# Patient Record
Sex: FEMALE | Race: WHITE | Employment: OTHER | URBAN - METROPOLITAN AREA
[De-identification: names, ages, dates, MRNs, and addresses within clinical notes are randomized per-mention and may not be internally consistent; named-entity substitution may affect disease eponyms.]

---

## 2017-12-11 ENCOUNTER — DOCUMENTATION ONLY (OUTPATIENT)
Dept: FAMILY MEDICINE CLINIC | Age: 77
End: 2017-12-11

## 2017-12-11 ENCOUNTER — HOSPITAL ENCOUNTER (OUTPATIENT)
Dept: LAB | Age: 77
Discharge: HOME | End: 2017-12-11
Payer: MEDICARE

## 2017-12-11 ENCOUNTER — OFFICE VISIT (OUTPATIENT)
Dept: FAMILY MEDICINE CLINIC | Age: 77
End: 2017-12-11

## 2017-12-11 VITALS
HEART RATE: 94 BPM | DIASTOLIC BLOOD PRESSURE: 98 MMHG | BODY MASS INDEX: 24.46 KG/M2 | HEIGHT: 65 IN | SYSTOLIC BLOOD PRESSURE: 152 MMHG | RESPIRATION RATE: 18 BRPM | WEIGHT: 146.8 LBS | TEMPERATURE: 97.7 F | OXYGEN SATURATION: 93 %

## 2017-12-11 DIAGNOSIS — E03.9 ACQUIRED HYPOTHYROIDISM: ICD-10-CM

## 2017-12-11 DIAGNOSIS — E78.5 HYPERLIPIDEMIA, UNSPECIFIED HYPERLIPIDEMIA TYPE: ICD-10-CM

## 2017-12-11 DIAGNOSIS — R09.81 SINUS CONGESTION: Primary | ICD-10-CM

## 2017-12-11 DIAGNOSIS — Z76.89 ENCOUNTER TO ESTABLISH CARE: ICD-10-CM

## 2017-12-11 LAB
CHOLEST SERPL-MCNC: 186 MG/DL
HDLC SERPL-MCNC: 59 MG/DL (ref 40–60)
HDLC SERPL: 3.2 {RATIO} (ref 0–5)
LDLC SERPL CALC-MCNC: 92.8 MG/DL (ref 0–100)
LIPID PROFILE,FLP: ABNORMAL
TRIGL SERPL-MCNC: 171 MG/DL (ref ?–150)
TSH SERPL DL<=0.05 MIU/L-ACNC: 1.01 UIU/ML (ref 0.36–3.74)
VLDLC SERPL CALC-MCNC: 34.2 MG/DL

## 2017-12-11 PROCEDURE — 84443 ASSAY THYROID STIM HORMONE: CPT | Performed by: PHYSICIAN ASSISTANT

## 2017-12-11 PROCEDURE — 80061 LIPID PANEL: CPT | Performed by: PHYSICIAN ASSISTANT

## 2017-12-11 PROCEDURE — 36415 COLL VENOUS BLD VENIPUNCTURE: CPT | Performed by: PHYSICIAN ASSISTANT

## 2017-12-11 RX ORDER — LEVOTHYROXINE SODIUM 112 UG/1
TABLET ORAL
COMMUNITY
End: 2017-12-11 | Stop reason: SDUPTHER

## 2017-12-11 RX ORDER — ATORVASTATIN CALCIUM 20 MG/1
20 TABLET, FILM COATED ORAL DAILY
Qty: 30 TAB | Refills: 2 | Status: SHIPPED | OUTPATIENT
Start: 2017-12-11 | End: 2019-01-05 | Stop reason: SDUPTHER

## 2017-12-11 RX ORDER — ESZOPICLONE 3 MG/1
3 TABLET, FILM COATED ORAL
Refills: 0 | COMMUNITY
Start: 2017-11-16 | End: 2017-12-11 | Stop reason: SDUPTHER

## 2017-12-11 RX ORDER — LEVOTHYROXINE SODIUM 112 UG/1
112 TABLET ORAL
Qty: 30 TAB | Refills: 2 | Status: SHIPPED | OUTPATIENT
Start: 2017-12-11 | End: 2019-01-05 | Stop reason: SDUPTHER

## 2017-12-11 RX ORDER — CYCLOBENZAPRINE HCL 10 MG
10 TABLET ORAL
COMMUNITY
End: 2018-06-19 | Stop reason: SDUPTHER

## 2017-12-11 RX ORDER — AMOXICILLIN AND CLAVULANATE POTASSIUM 500; 125 MG/1; MG/1
1 TABLET, FILM COATED ORAL 2 TIMES DAILY
Qty: 20 TAB | Refills: 0 | Status: SHIPPED | OUTPATIENT
Start: 2017-12-11 | End: 2017-12-21

## 2017-12-11 RX ORDER — ESZOPICLONE 3 MG/1
3 TABLET, FILM COATED ORAL
Qty: 30 TAB | Refills: 0 | Status: SHIPPED | OUTPATIENT
Start: 2017-12-11 | End: 2018-01-08 | Stop reason: SDUPTHER

## 2017-12-11 RX ORDER — OMEPRAZOLE 20 MG/1
20 CAPSULE, DELAYED RELEASE ORAL DAILY
COMMUNITY
End: 2018-06-19 | Stop reason: SDUPTHER

## 2017-12-11 RX ORDER — ATORVASTATIN CALCIUM 20 MG/1
TABLET, FILM COATED ORAL DAILY
COMMUNITY
End: 2017-12-11 | Stop reason: SDUPTHER

## 2017-12-11 RX ORDER — METOPROLOL SUCCINATE 50 MG/1
50 TABLET, EXTENDED RELEASE ORAL DAILY
COMMUNITY
End: 2018-06-19 | Stop reason: SDUPTHER

## 2017-12-11 RX ORDER — ALBUTEROL SULFATE 90 UG/1
2 AEROSOL, METERED RESPIRATORY (INHALATION)
COMMUNITY
End: 2018-06-19 | Stop reason: SDUPTHER

## 2017-12-11 NOTE — MR AVS SNAPSHOT
[address] [facility] [address] 
[phone] Patient: Helen Jacome MRN: [id number] IF Visit Information Date & Time Provider Department Dept. Phone Encounter #  
 2017 10:00 AM Meseret Grande, [address] [facility] [phone] [id number] Upcoming Health Maintenance Date Due DTaP/Tdap/Td series (1 - Tdap) 1962 ZOSTER VACCINE AGE 60> 10/31/2001 GLAUCOMA SCREENING Q2Y 2006 OSTEOPOROSIS SCREENING (DEXA) 2006 Pneumococcal 65+ Low/Medium Risk (1 of 2 - PCV13) 2006 MEDICARE YEARLY EXAM 2006 Influenza Age 5 to Adult 2017 Allergies as of 2017  Review Complete On: 2017 By: SHAR Weaver Not on File Current Immunizations  Reviewed on 2017 Name Date Influenza High Dose Vaccine PF 2017 Reviewed by Andrzej Patricio LPN on  at 10:28 AM  
You Were Diagnosed With   
  
 Codes Comments Sinus congestion    -  Primary ICD-10-CM: R09.81 ICD-9-CM: 478.19 Acquired hypothyroidism     ICD-10-CM: E03.9 ICD-9-CM: 244.9 Hyperlipidemia, unspecified hyperlipidemia type     ICD-10-CM: E78.5 ICD-9-CM: 272.4 Encounter to establish care     ICD-10-CM: Z76.89 
ICD-9-CM: V65.8 Vitals BP Pulse Temp Resp Height(growth percentile) Weight(growth percentile) (!) 152/98 (BP 1 Location: Right arm, BP Patient Position: Sitting) 94 97.7 °F (36.5 °C) (Oral) 18 5' 4.5\" (1.638 m) 146 lb 12.8 oz (66.6 kg) SpO2 BMI OB Status Smoking Status 93% 24.81 kg/m2 Hysterectomy Never Smoker BMI and BSA Data Body Mass Index Body Surface Area  
 24.81 kg/m 2 1.74 m 2 Preferred Pharmacy Pharmacy Name Phone [address] [place] [address] [phone] Your Updated Medication List  
  
   
 This list is accurate as of: 12/11/17 12:14 PM.  Always use your most recent med list.  
  
  
  
  
 amoxicillin-clavulanate 500-125 mg per tablet Commonly known as:  AUGMENTIN Take 1 Tab by mouth two (2) times a day for 10 days. atorvastatin 20 mg tablet Commonly known as:  LIPITOR Take 1 Tab by mouth daily for 30 days. cyclobenzaprine 10 mg tablet Commonly known as:  FLEXERIL Take  by mouth three (3) times daily as needed for Muscle Spasm(s).  
  
 eszopiclone 3 mg tablet Commonly known as:  Denyce Luke Take 1 Tab by mouth nightly for 30 days. Max Daily Amount: 3 mg.  
  
 levothyroxine 112 mcg tablet Commonly known as:  SYNTHROID Take 1 Tab by mouth Daily (before breakfast) for 30 days. metoprolol succinate 50 mg XL tablet Commonly known as:  TOPROL-XL Take  by mouth daily. omeprazole 20 mg capsule Commonly known as:  PRILOSEC Take 20 mg by mouth daily. PROAIR HFA 90 mcg/actuation inhaler Generic drug:  albuterol Take 2 Puffs by inhalation three (3) times daily as needed for Wheezing. Prescriptions Printed Refills  
 atorvastatin (LIPITOR) 20 mg tablet 2 Sig: Take 1 Tab by mouth daily for 30 days. Class: Print Route: Oral  
 levothyroxine (SYNTHROID) 112 mcg tablet 2 Sig: Take 1 Tab by mouth Daily (before breakfast) for 30 days. Class: Print Route: Oral  
 eszopiclone (LUNESTA) 3 mg tablet 0 Sig: Take 1 Tab by mouth nightly for 30 days. Max Daily Amount: 3 mg. Class: Print Route: Oral  
 amoxicillin-clavulanate (AUGMENTIN) 500-125 mg per tablet 0 Sig: Take 1 Tab by mouth two (2) times a day for 10 days. Class: Print Route: Oral  
  
Introducing [facility]! [facility] introduces Contactually patient portal. Now you can access parts of your medical record, email your doctor's office, and request medication refills online. 1. In your internet browser, go to [email or web address] 2. Click on the First Time User? Click Here link in the Sign In box. You will see the New Member Sign Up page. 3. Enter your [employer] Access Code exactly as it appears below. You will not need to use this code after youve completed the sign-up process. If you do not sign up before the expiration date, you must request a new code. · [employer] Access Code: [email or web address] Expires: 3/11/2018 11:00 AM 
 
4. Enter the last four digits of your Social Security Number (xxxx) and Date of Birth (mm/dd/yyyy) as indicated and click Submit. You will be taken to the next sign-up page. 5. Create a [employer] ID. This will be your [employer] login ID and cannot be changed, so think of one that is secure and easy to remember. 6. Create a [employer] password. You can change your password at any time. 7. Enter your Password Reset Question and Answer. This can be used at a later time if you forget your password. 8. Enter your e-mail address. You will receive e-mail notification when new information is available in [address] 9. Click Sign Up. You can now view and download portions of your medical record. 10. Click the Download Summary menu link to download a portable copy of your medical information. If you have questions, please visit the Frequently Asked Questions section of the [employer] website. Remember, [employer] is NOT to be used for urgent needs. For medical emergencies, dial 911. Now available from your iPhone and Android! Please provide this summary of care documentation to your next provider. Your primary care clinician is listed as Azael Vance. If you have any questions after today's visit, please call [phone].

## 2017-12-11 NOTE — PROGRESS NOTES
Tyrell Sorenson is a 77 y.o. female presents in office to establish care. Has c/o upper respiratory infection.

## 2017-12-12 PROBLEM — E03.9 ACQUIRED HYPOTHYROIDISM: Status: ACTIVE | Noted: 2017-12-12

## 2017-12-12 PROBLEM — G47.9 SLEEP DISORDER: Status: ACTIVE | Noted: 2017-12-12

## 2017-12-12 PROBLEM — E78.5 HYPERLIPIDEMIA: Status: ACTIVE | Noted: 2017-12-12

## 2017-12-12 PROBLEM — I10 ESSENTIAL HYPERTENSION: Status: ACTIVE | Noted: 2017-12-12

## 2017-12-12 NOTE — PROGRESS NOTES
HISTORY OF PRESENT ILLNESS  Rohith Colorado is a 77 y.o. female who presents to center with sinus congestion and request for medication refill for her hypothyroidism and hyperlipidemia. She has had sinus congestion for approximately 1 week on a daily basis, congestion was mild initially and in the last few days it has gotten slightly worse and she has mild constant sinus pressure and her ears feel slightly full at times. Nasal discharge has gotten thicker and slightly darker yellow. Denies headache. She has taken cough drops and try to keep well-hydrated. She has a history of hypothyroidism that she was diagnosed with many years ago, insomnia, and also has hyperlipidemia and hypertension. She is almost out of medications for these issues. She denies any unexplained weight gain, cold intolerance or general fatigue. Sinus Infection    The history is provided by the patient. This is a new problem. The current episode started more than 2 days ago. The problem has been gradually worsening. There has been no fever. Associated symptoms include congestion and sinus pressure. Pertinent negatives include no chills, no sweats, no ear pain, no sore throat, no cough, no shortness of breath, no headaches and no chest pain. She has tried drinking for the symptoms. The treatment provided no relief. Thyroid Problem   The history is provided by the patient. This is a chronic problem. Pertinent negatives include no chest pain, no headaches and no shortness of breath. Nothing aggravates the symptoms. The symptoms are relieved by medications. Cholesterol Problem   The history is provided by the patient. This is a chronic problem. Pertinent negatives include no chest pain, no headaches and no shortness of breath. Nothing aggravates the symptoms. The symptoms are relieved by medications. Hypertension    The history is provided by the patient. This is a chronic problem.  Pertinent negatives include no chest pain, no palpitations, no malaise/fatigue, no blurred vision, no headaches, no dizziness and no shortness of breath. There are no associated agents to hypertension. Visit Vitals    BP (!) 152/98 (BP 1 Location: Right arm, BP Patient Position: Sitting)  Comment: right arm    Pulse 94    Temp 97.7 °F (36.5 °C) (Oral)    Resp 18    Ht 5' 4.5\" (1.638 m)    Wt 146 lb 12.8 oz (66.6 kg)    SpO2 93%    BMI 24.81 kg/m2     Past Medical History:   Diagnosis Date    Asthma     Hypercholesterolemia     Hypertension     Injury of shoulder     right should d/t fall    Lung mass     Osteoarthrosis involving more than one site     severe in neck. also in hip and thigh    Thyroid disease        Current Outpatient Prescriptions:     albuterol (PROAIR HFA) 90 mcg/actuation inhaler, Take 2 Puffs by inhalation three (3) times daily as needed for Wheezing., Disp: , Rfl:     cyclobenzaprine (FLEXERIL) 10 mg tablet, Take  by mouth three (3) times daily as needed for Muscle Spasm(s). , Disp: , Rfl:     omeprazole (PRILOSEC) 20 mg capsule, Take 20 mg by mouth daily. , Disp: , Rfl:     metoprolol succinate (TOPROL-XL) 50 mg XL tablet, Take  by mouth daily. , Disp: , Rfl:     atorvastatin (LIPITOR) 20 mg tablet, Take 1 Tab by mouth daily for 30 days. , Disp: 30 Tab, Rfl: 2    levothyroxine (SYNTHROID) 112 mcg tablet, Take 1 Tab by mouth Daily (before breakfast) for 30 days. , Disp: 30 Tab, Rfl: 2    eszopiclone (LUNESTA) 3 mg tablet, Take 1 Tab by mouth nightly for 30 days. Max Daily Amount: 3 mg., Disp: 30 Tab, Rfl: 0    amoxicillin-clavulanate (AUGMENTIN) 500-125 mg per tablet, Take 1 Tab by mouth two (2) times a day for 10 days. , Disp: 20 Tab, Rfl: 0    Not on File      Review of Systems   Constitutional: Negative for chills, fever and malaise/fatigue. HENT: Positive for congestion and sinus pressure. Negative for ear pain and sore throat. Sinus pain: nasal/sinus congestion and sinus pressure. Eyes: Negative for blurred vision. Respiratory: Negative for cough and shortness of breath. Cardiovascular: Negative for chest pain and palpitations. Musculoskeletal: Negative for myalgias. Skin: Negative for rash. Neurological: Negative for dizziness and headaches. Physical Exam   Constitutional: She appears well-developed and well-nourished. No distress. HENT:   Right Ear: External ear normal.   Left Ear: External ear normal.   Nose: Nose normal.   Mouth/Throat: Oropharynx is clear and moist.   Mild tenderness to palpation over maxillary sinuses   Neck: Neck supple. No thyromegaly present. Cardiovascular: Normal rate, regular rhythm, normal heart sounds and intact distal pulses. Pulmonary/Chest: Effort normal and breath sounds normal.   Lymphadenopathy:     She has no cervical adenopathy. Skin: Skin is warm and dry. ASSESSMENT and PLAN    ICD-10-CM ICD-9-CM    1. Sinus congestion R09.81 478.19    2. Acquired hypothyroidism E03.9 244.9 levothyroxine (SYNTHROID) 112 mcg tablet      TSH 3RD GENERATION   3. Hyperlipidemia, unspecified hyperlipidemia type E78.5 272.4 atorvastatin (LIPITOR) 20 mg tablet      eszopiclone (LUNESTA) 3 mg tablet      LIPID PANEL   4. Encounter to establish care Z76.89 V65.8      Patient is given medication refills as requested. Labs ordered as noted. Due to blood pressure being elevated today and her being a new patient, would like for her to return to center in 3 months for blood pressure check and general follow-up. Patient verbalized understanding and agreed with plan.

## 2018-01-03 NOTE — PROGRESS NOTES
Please call pt to inform her that trigs were a little higher than previously, so she is to make sure that she takes her medication. Also adhere to a low chol diet and RTC in 2 months for repeat lipid panel.

## 2018-01-05 ENCOUNTER — TELEPHONE (OUTPATIENT)
Dept: FAMILY MEDICINE CLINIC | Age: 78
End: 2018-01-05

## 2018-01-08 DIAGNOSIS — E78.5 HYPERLIPIDEMIA, UNSPECIFIED HYPERLIPIDEMIA TYPE: ICD-10-CM

## 2018-01-08 NOTE — TELEPHONE ENCOUNTER
Pt calling to request medication refill of:    Requested Prescriptions     Pending Prescriptions Disp Refills    eszopiclone (LUNESTA) 3 mg tablet 30 Tab 0     Sig: Take 1 Tab by mouth nightly for 30 days. Max Daily Amount: 3 mg.          be printed. Pt has about 6 tabs remaining. Pts last appt was 12/11/17, next appt sched for 6/11/18. Advised pt of 72 hour time frame for refill requests. Please advise.

## 2018-01-09 RX ORDER — ESZOPICLONE 3 MG/1
3 TABLET, FILM COATED ORAL
Qty: 30 TAB | Refills: 0 | Status: SHIPPED | OUTPATIENT
Start: 2018-01-09 | End: 2018-02-06 | Stop reason: SDUPTHER

## 2018-01-19 PROBLEM — E55.9 VITAMIN D DEFICIENCY: Status: ACTIVE | Noted: 2018-01-19

## 2018-01-19 PROBLEM — K21.9 GASTROESOPHAGEAL REFLUX DISEASE WITHOUT ESOPHAGITIS: Status: ACTIVE | Noted: 2018-01-19

## 2018-01-19 PROBLEM — R42 CHRONIC VERTIGO: Status: ACTIVE | Noted: 2018-01-19

## 2018-02-06 DIAGNOSIS — E78.5 HYPERLIPIDEMIA, UNSPECIFIED HYPERLIPIDEMIA TYPE: ICD-10-CM

## 2018-02-06 NOTE — TELEPHONE ENCOUNTER
Pt calling to request medication refill of:  Requested Prescriptions     Pending Prescriptions Disp Refills    eszopiclone (LUNESTA) 3 mg tablet 30 Tab 0     Sig: Take 1 Tab by mouth nightly for 30 days. Max Daily Amount: 3 mg.          be printed   Pt has about 8 tabs remaining. Pts last appt was 12/11, next appt sched for 3/11   Advised pt of 72 hour time frame for refill requests. Please advise.

## 2018-02-07 RX ORDER — ESZOPICLONE 3 MG/1
3 TABLET, FILM COATED ORAL
Qty: 30 TAB | Refills: 0 | Status: SHIPPED | OUTPATIENT
Start: 2018-02-07 | End: 2018-03-07 | Stop reason: SDUPTHER

## 2018-03-07 DIAGNOSIS — E03.9 ACQUIRED HYPOTHYROIDISM: Primary | ICD-10-CM

## 2018-03-07 DIAGNOSIS — E78.5 HYPERLIPIDEMIA, UNSPECIFIED HYPERLIPIDEMIA TYPE: ICD-10-CM

## 2018-03-07 RX ORDER — LEVOTHYROXINE SODIUM 112 UG/1
112 TABLET ORAL
Qty: 90 TAB | Refills: 0 | Status: SHIPPED | OUTPATIENT
Start: 2018-03-07 | End: 2018-06-19 | Stop reason: SDUPTHER

## 2018-03-07 RX ORDER — ESZOPICLONE 3 MG/1
3 TABLET, FILM COATED ORAL
Qty: 30 TAB | Refills: 2 | Status: SHIPPED | OUTPATIENT
Start: 2018-03-07 | End: 2018-04-06

## 2018-03-07 NOTE — TELEPHONE ENCOUNTER
Pt calling to request medication refill of:    Requested Prescriptions     Pending Prescriptions Disp Refills    levothyroxine (SYNTHROID) 112 mcg tablet 90 Tab 1     Sig: Take 1 Tab by mouth Daily (before breakfast).  eszopiclone (LUNESTA) 3 mg tablet 30 Tab 2     Sig: Take 1 Tab by mouth nightly for 30 days. Max Daily Amount: 3 mg.          be printed   Pt has about 10 tabs remaining. Pts last appt was 12/11/17, next appt sched for 6/11/18. Advised pt of 72 hour time frame for refill requests. Please advise.

## 2018-03-07 NOTE — TELEPHONE ENCOUNTER
SHAR Nieto, please see refill request for patient, thank you! Requested Prescriptions     Pending Prescriptions Disp Refills    levothyroxine (SYNTHROID) 112 mcg tablet 90 Tab 1     Sig: Take 1 Tab by mouth Daily (before breakfast).  eszopiclone (LUNESTA) 3 mg tablet 30 Tab 2     Sig: Take 1 Tab by mouth nightly for 30 days. Max Daily Amount: 3 mg.

## 2018-06-04 DIAGNOSIS — G47.9 SLEEP DISORDER: Primary | ICD-10-CM

## 2018-06-04 NOTE — TELEPHONE ENCOUNTER
Pt calling to request medication refill of:    Requested Prescriptions     Pending Prescriptions Disp Refills    eszopiclone (LUNESTA) 3 mg tablet 30 Tab 0     Sig: Take 1 Tab by mouth nightly. Max Daily Amount: 3 mg.          be sent to [facility] [address]. Pt has about 3 tabs remaining. Pts last appt was 12/11/17, next appt sched for 06/11/18, but patient stated she will have to r/s due to going out of town to later date. Advised pt of 72 hour time frame for refill requests. Please advise.

## 2018-06-04 NOTE — TELEPHONE ENCOUNTER
SHAR Nieto, please see refill request for patient, Patient has follow up 06/11/18 thank you! Requested Prescriptions     Pending Prescriptions Disp Refills    eszopiclone (LUNESTA) 3 mg tablet 30 Tab 2     Sig: Take 1 Tab by mouth nightly. Max Daily Amount: 3 mg.

## 2018-06-06 RX ORDER — ESZOPICLONE 3 MG/1
3 TABLET, FILM COATED ORAL
Qty: 30 TAB | Refills: 2 | Status: SHIPPED | OUTPATIENT
Start: 2018-06-06 | End: 2018-10-09 | Stop reason: SDUPTHER

## 2018-06-19 ENCOUNTER — HOSPITAL ENCOUNTER (OUTPATIENT)
Dept: LAB | Age: 78
Discharge: HOME | End: 2018-06-19
Payer: MEDICARE

## 2018-06-19 ENCOUNTER — OFFICE VISIT (OUTPATIENT)
Dept: FAMILY MEDICINE CLINIC | Age: 78
End: 2018-06-19

## 2018-06-19 VITALS
DIASTOLIC BLOOD PRESSURE: 80 MMHG | RESPIRATION RATE: 19 BRPM | BODY MASS INDEX: 24.12 KG/M2 | OXYGEN SATURATION: 95 % | HEART RATE: 82 BPM | WEIGHT: 144.8 LBS | SYSTOLIC BLOOD PRESSURE: 148 MMHG | TEMPERATURE: 98 F | HEIGHT: 65 IN

## 2018-06-19 DIAGNOSIS — Z00.00 MEDICARE ANNUAL WELLNESS VISIT, INITIAL: ICD-10-CM

## 2018-06-19 DIAGNOSIS — E78.5 HYPERLIPIDEMIA, UNSPECIFIED HYPERLIPIDEMIA TYPE: ICD-10-CM

## 2018-06-19 DIAGNOSIS — E03.9 ACQUIRED HYPOTHYROIDISM: ICD-10-CM

## 2018-06-19 DIAGNOSIS — K21.9 GASTROESOPHAGEAL REFLUX DISEASE WITHOUT ESOPHAGITIS: ICD-10-CM

## 2018-06-19 DIAGNOSIS — I10 ESSENTIAL HYPERTENSION: Primary | ICD-10-CM

## 2018-06-19 DIAGNOSIS — I10 ESSENTIAL HYPERTENSION: ICD-10-CM

## 2018-06-19 LAB
ALBUMIN SERPL-MCNC: 4.4 G/DL (ref 3.4–5)
ALBUMIN/GLOB SERPL: 1.2 {RATIO} (ref 0.8–1.7)
ALP SERPL-CCNC: 82 U/L (ref 45–117)
ALT SERPL-CCNC: 25 U/L (ref 13–56)
ANION GAP SERPL CALC-SCNC: 8 MMOL/L (ref 3–18)
AST SERPL-CCNC: 22 U/L (ref 15–37)
BILIRUB SERPL-MCNC: 1.7 MG/DL (ref 0.2–1)
BUN SERPL-MCNC: 10 MG/DL (ref 7–18)
BUN/CREAT SERPL: 15 (ref 12–20)
CALCIUM SERPL-MCNC: 9.2 MG/DL (ref 8.5–10.1)
CHLORIDE SERPL-SCNC: 105 MMOL/L (ref 100–108)
CHOLEST SERPL-MCNC: 180 MG/DL
CO2 SERPL-SCNC: 28 MMOL/L (ref 21–32)
CREAT SERPL-MCNC: 0.65 MG/DL (ref 0.6–1.3)
GLOBULIN SER CALC-MCNC: 3.6 G/DL (ref 2–4)
GLUCOSE SERPL-MCNC: 100 MG/DL (ref 74–99)
HDLC SERPL-MCNC: 52 MG/DL (ref 40–60)
HDLC SERPL: 3.5 {RATIO} (ref 0–5)
LDLC SERPL CALC-MCNC: 94.6 MG/DL (ref 0–100)
LIPID PROFILE,FLP: ABNORMAL
POTASSIUM SERPL-SCNC: 3.9 MMOL/L (ref 3.5–5.5)
PROT SERPL-MCNC: 8 G/DL (ref 6.4–8.2)
SODIUM SERPL-SCNC: 141 MMOL/L (ref 136–145)
TRIGL SERPL-MCNC: 167 MG/DL (ref ?–150)
TSH SERPL DL<=0.05 MIU/L-ACNC: 0.65 UIU/ML (ref 0.36–3.74)
VLDLC SERPL CALC-MCNC: 33.4 MG/DL

## 2018-06-19 PROCEDURE — 84443 ASSAY THYROID STIM HORMONE: CPT | Performed by: PHYSICIAN ASSISTANT

## 2018-06-19 PROCEDURE — 80053 COMPREHEN METABOLIC PANEL: CPT | Performed by: PHYSICIAN ASSISTANT

## 2018-06-19 PROCEDURE — 80061 LIPID PANEL: CPT | Performed by: PHYSICIAN ASSISTANT

## 2018-06-19 RX ORDER — LEVOTHYROXINE SODIUM 112 UG/1
112 TABLET ORAL
Qty: 90 TAB | Refills: 1 | Status: SHIPPED | OUTPATIENT
Start: 2018-06-19 | End: 2019-01-05 | Stop reason: SDUPTHER

## 2018-06-19 RX ORDER — OMEPRAZOLE 20 MG/1
20 CAPSULE, DELAYED RELEASE ORAL DAILY
Qty: 90 CAP | Refills: 1 | Status: SHIPPED | OUTPATIENT
Start: 2018-06-19 | End: 2019-01-05 | Stop reason: SDUPTHER

## 2018-06-19 RX ORDER — ATORVASTATIN CALCIUM 20 MG/1
20 TABLET, FILM COATED ORAL
COMMUNITY
End: 2018-07-19 | Stop reason: SDUPTHER

## 2018-06-19 RX ORDER — CYCLOBENZAPRINE HCL 10 MG
10 TABLET ORAL
Qty: 180 TAB | Refills: 1 | Status: SHIPPED | OUTPATIENT
Start: 2018-06-19 | End: 2019-01-05 | Stop reason: SDUPTHER

## 2018-06-19 RX ORDER — METOPROLOL SUCCINATE 50 MG/1
50 TABLET, EXTENDED RELEASE ORAL DAILY
Qty: 90 TAB | Refills: 1 | Status: SHIPPED | OUTPATIENT
Start: 2018-06-19 | End: 2019-01-05 | Stop reason: SDUPTHER

## 2018-06-19 RX ORDER — ALBUTEROL SULFATE 90 UG/1
2 AEROSOL, METERED RESPIRATORY (INHALATION)
Qty: 1 INHALER | Refills: 1 | Status: SHIPPED | OUTPATIENT
Start: 2018-06-19 | End: 2019-02-06 | Stop reason: SDUPTHER

## 2018-06-19 NOTE — PROGRESS NOTES
HISTORY OF PRESENT ILLNESS  Cody Vides is a 69 y.o. female presenting to center for follow-up and prescription refills for chronic issues. She states she takes her medication daily as directed and denies any side effects. She states active and eats healthy on a daily basis. She feels well and has no complaints. Cholesterol Problem   The history is provided by the patient. This is a chronic problem. The problem has been gradually improving. Pertinent negatives include no chest pain, no abdominal pain, no headaches and no shortness of breath. Nothing aggravates the symptoms. Hypertension    This is a chronic problem. The problem has been gradually improving. Pertinent negatives include no chest pain, no palpitations, no malaise/fatigue, no blurred vision, no headaches, no dizziness, no shortness of breath and no nausea. There are no associated agents to hypertension. Risk factors include family history. Thyroid Problem   This is a chronic problem. The problem occurs daily. Progression since onset: managed with medication. Pertinent negatives include no chest pain, no abdominal pain, no headaches and no shortness of breath. Nothing aggravates the symptoms. The symptoms are relieved by medications. Past Medical History:   Diagnosis Date    Asthma     Hypercholesterolemia     Hypertension     Injury of shoulder     right should d/t fall    Lung mass     Osteoarthrosis involving more than one site     severe in neck. also in hip and thigh    Thyroid disease      Current Outpatient Prescriptions on File Prior to Visit   Medication Sig Dispense Refill    eszopiclone (LUNESTA) 3 mg tablet Take 1 Tab by mouth nightly. Max Daily Amount: 3 mg. 30 Tab 2    [DISCONTINUED] levothyroxine (SYNTHROID) 112 mcg tablet Take 1 Tab by mouth Daily (before breakfast). 90 Tab 0    levothyroxine (SYNTHROID) 112 mcg tablet Take 1 Tab by mouth Daily (before breakfast) for 30 days.  30 Tab 2    [DISCONTINUED] albuterol (PROAIR HFA) 90 mcg/actuation inhaler Take 2 Puffs by inhalation three (3) times daily as needed for Wheezing.  [DISCONTINUED] cyclobenzaprine (FLEXERIL) 10 mg tablet Take 10 mg by mouth three (3) times daily as needed for Muscle Spasm(s).  [DISCONTINUED] omeprazole (PRILOSEC) 20 mg capsule Take 20 mg by mouth daily.  [DISCONTINUED] metoprolol succinate (TOPROL-XL) 50 mg XL tablet Take 50 mg by mouth daily. No current facility-administered medications on file prior to visit. Allergies   Allergen Reactions    Levofloxacin Anaphylaxis    Cefaclor Unknown (comments)    Other Plant, Animal, Environmental Runny Nose     Itchy, waterly eye    Trazodone Vertigo     Light-headed       Review of Systems   Constitutional: Negative for diaphoresis and malaise/fatigue. Eyes: Negative for blurred vision and photophobia. Respiratory: Negative for shortness of breath. Cardiovascular: Negative for chest pain, palpitations and leg swelling. Gastrointestinal: Negative for abdominal pain and nausea. Musculoskeletal: Negative for myalgias. Neurological: Negative for dizziness, sensory change, weakness and headaches. Objective  Visit Vitals    /80 (BP 1 Location: Right arm, BP Patient Position: Sitting)  Comment: manual    Pulse 82    Temp 98 °F (36.7 °C) (Oral)    Resp 19    Ht 5' 4.5\" (1.638 m)    Wt 144 lb 12.8 oz (65.7 kg)    SpO2 95%    BMI 24.47 kg/m2     Physical Exam   Constitutional: She is oriented to person, place, and time. She appears well-developed and well-nourished. No distress. Eyes: EOM are normal. Pupils are equal, round, and reactive to light. Neck: Neck supple. No JVD present. No thyromegaly present. Cardiovascular: Normal rate, regular rhythm, normal heart sounds and intact distal pulses. Pulmonary/Chest: Effort normal and breath sounds normal.   Neurological: She is alert and oriented to person, place, and time.        ASSESSMENT and PLAN ICD-10-CM ICD-9-CM    1. Essential hypertension G68 786.1 METABOLIC PANEL, COMPREHENSIVE   2. Acquired hypothyroidism E03.9 244.9 levothyroxine (SYNTHROID) 112 mcg tablet      TSH 3RD GENERATION   3. Hyperlipidemia, unspecified hyperlipidemia type E78.5 272.4 LIPID PANEL      METABOLIC PANEL, COMPREHENSIVE   4. Gastroesophageal reflux disease without esophagitis K21.9 530.81      Prescription refills given and labs ordered. Her blood pressure was elevated today and we talked about increasing her dosage of Toprol, but she decided she would rather increase her physical activity such as riding her bike more and see if the blood pressure starts to come down. Also advised her to come by the center with her blood pressure cuff and compare her blood pressure cuff reading to our reading to make sure that hers is reliable. She is to return to center in 6 months for follow-up unless labs indicate her to return sooner or she has any concerns. Questions answered and patient verbalized understanding and agreed with plan.

## 2018-06-19 NOTE — PATIENT INSTRUCTIONS
Medicare Wellness Visit, Female    The best way to live healthy is to have a lifestyle where you eat a well-balanced diet, exercise regularly, limit alcohol use, and quit all forms of tobacco/nicotine, if applicable. Regular preventive services are another way to keep healthy. Preventive services (vaccines, screening tests, monitoring & exams) can help personalize your care plan, which helps you manage your own care. Screening tests can find health problems at the earliest stages, when they are easiest to treat. [facility] follows the current, evidence-based guidelines published by the [facility] Rory Anup ([facility]) when recommending preventive services for our patients. Because we follow these guidelines, sometimes recommendations change over time as research supports it. (For example, mammograms used to be recommended annually. Even though Medicare will still pay for an annual mammogram, the newer guidelines recommend a mammogram every two years for women of average risk.)    Of course, you and your provider may decide to screen more often for some diseases, based on your risk and co-morbidities (chronic disease you are already diagnosed with). Preventive services for you include:    - Medicare offers their members a free annual wellness visit, which is time for you and your primary care provider to discuss and plan for your preventive service needs. Take advantage of this benefit every year!    -All people over age 72 should receive the recommended pneumonia vaccines. Current USPSTF guidelines recommend a series of two vaccines for the best pneumonia protection.     -All adults should have a yearly flu vaccine and a tetanus vaccine every 10 years. All adults age 61 years should receive a shingles vaccine once in their lifetime.      -A bone mass density test is recommended when a woman turns 65 to screen for osteoporosis.  This test is only recommended once as a screening. Some providers will use this same test as a disease monitoring tool if you already have osteoporosis. -All adults age 38-68 years who are overweight should have a diabetes screening test once every three years.     -Other screening tests & preventive services for persons with diabetes include: an eye exam to screen for diabetic retinopathy, a kidney function test, a foot exam, and stricter control over your cholesterol.     -Cardiovascular screening for adults with routine risk involves an electrocardiogram (ECG) at intervals determined by the provider.     -Colorectal cancer screenings should be done for adults age 54-65 years with normal risk. There are a number of acceptable methods of screening for this type of cancer. Each test has its own benefits and drawbacks. Discuss with your provider what is most appropriate for you during your annual wellness visit. The different tests include: colonoscopy (considered the best screening method), a fecal occult blood test, a fecal DNA test, and sigmoidoscopy. -Breast cancer screenings are recommended every other year for women of normal risk age 54-69 years.     -Cervical cancer screenings for women over age 72 are only recommended with certain risk factors.     -All adults born between [facility] should be screened once for Hepatitis C.      Here is a list of your current Health Maintenance items (your personalized list of preventive services) with a due date:  Health Maintenance Due   Topic Date Due    DTaP/Tdap/Td  (1 - Tdap) 12/31/1962    Shingles Vaccine  10/31/2001    Glaucoma Screening   12/31/2006    Bone Mineral Density   12/31/2006    Pneumococcal Vaccine (1 of 2 - PCV13) 12/31/2006    Annual Well Visit  03/14/2018

## 2018-06-19 NOTE — PROGRESS NOTES
Erica Gan is a 69 y.o. female presents in office for 6 mos f/u. Health Maintenance Due   Topic Date Due    DTaP/Tdap/Td series (1 - Tdap) 12/31/1962    ZOSTER VACCINE AGE 60>  10/31/2001    GLAUCOMA SCREENING Q2Y  12/31/2006    Bone Densitometry (Dexa) Screening  12/31/2006    Pneumococcal 65+ Low/Medium Risk (1 of 2 - PCV13) 12/31/2006    MEDICARE YEARLY EXAM  03/14/2018       1. Have you been to the ER, urgent care clinic since your last visit? Hospitalized since your last visit? No    2. Have you seen or consulted any other health care providers outside of the [address] [place] [address] since your last visit? Include any pap smears or colon screening.  No

## 2018-06-19 NOTE — PROGRESS NOTES
This is a \"Welcome to [employer]"  Initial Preventive Physical Examination (IPPE) providing Personalized Prevention Plan Services (Performed in the first 12 months of enrollment)    I have reviewed the patient's medical history in detail and updated the computerized patient record. History     Past Medical History:   Diagnosis Date    Asthma     Hypercholesterolemia     Hypertension     Injury of shoulder     right should d/t fall    Lung mass     Osteoarthrosis involving more than one site     severe in neck. also in hip and thigh    Thyroid disease       Past Surgical History:   Procedure Laterality Date    HX BARTHOLIN CYST MARSUPIALIZATION      x2    HX HIP REPLACEMENT Left 2015    HX HYSTERECTOMY  2012    HX OTHER SURGICAL      face lift    HX SEPTOPLASTY      HX TUBAL LIGATION       Current Outpatient Prescriptions   Medication Sig Dispense Refill    atorvastatin (LIPITOR) 20 mg tablet Take 20 mg by mouth nightly.  omeprazole (PRILOSEC) 20 mg capsule Take 1 Cap by mouth daily for 180 days. 90 Cap 1    metoprolol succinate (TOPROL-XL) 50 mg XL tablet Take 1 Tab by mouth daily for 180 days. 90 Tab 1    levothyroxine (SYNTHROID) 112 mcg tablet Take 1 Tab by mouth Daily (before breakfast) for 180 days. 90 Tab 1    cyclobenzaprine (FLEXERIL) 10 mg tablet Take 1 Tab by mouth three (3) times daily as needed for Muscle Spasm(s) for up to 90 days. 180 Tab 1    albuterol (PROAIR HFA) 90 mcg/actuation inhaler Take 2 Puffs by inhalation three (3) times daily as needed for Wheezing for up to 180 days. 1 Inhaler 1    eszopiclone (LUNESTA) 3 mg tablet Take 1 Tab by mouth nightly. Max Daily Amount: 3 mg. 30 Tab 2    levothyroxine (SYNTHROID) 112 mcg tablet Take 1 Tab by mouth Daily (before breakfast) for 30 days.  30 Tab 2     Allergies   Allergen Reactions    Levofloxacin Anaphylaxis    Cefaclor Unknown (comments)    Other Plant, Animal, Environmental Runny Nose     Itchy, waterly eye    Trazodone Vertigo     Light-headed     Family History   Problem Relation Age of Onset   25 [facility] Arthritis-rheumatoid Sister      Social History   Substance Use Topics    Smoking status: Never Smoker    Smokeless tobacco: Never Used    Alcohol use No     Diet, Lifestyle: No special diet    Exercise level: moderately active    Depression Risk Screen     PHQ over the last two weeks 6/19/2018   Little interest or pleasure in doing things Not at all   Feeling down, depressed or hopeless Not at all   Total Score PHQ 2 0     Alcohol Risk Screen   You do not drink alcohol or very rarely. Functional Ability and Level of Safety   Hearing Loss  Hearing is good. Vision Screening  Vision is good. No exam data present      Activities of Daily Living  The home contains: [employer]  Patient does total self care    Fall Risk Screen  Fall Risk Assessment, last 12 mths 6/19/2018   Able to walk? Yes   Fall in past 12 months? No   Fall with injury? -   Number of falls in past 12 months -   Fall Risk Score -       Abuse Screen  Patient is not abused    Screening EKG   EKG order placed: No    Patient Care Team   Patient Care Team:  SHAR Atwood as PCP - General (Family Practice)     End of Life Planning   Advanced care planning directives were discussed with the patient and /or family/caregiver. Assessment/Plan   Education and counseling provided:  Are appropriate based on today's review and evaluation    Diagnoses and all orders for this visit:    1. Essential hypertension  -     METABOLIC PANEL, COMPREHENSIVE; Future    2. Acquired hypothyroidism  -     levothyroxine (SYNTHROID) 112 mcg tablet; Take 1 Tab by mouth Daily (before breakfast) for 180 days.  -     TSH 3RD GENERATION; Future    3. Hyperlipidemia, unspecified hyperlipidemia type  -     LIPID PANEL; Future  -     METABOLIC PANEL, COMPREHENSIVE; Future    4.  Gastroesophageal reflux disease without esophagitis    Other orders  -     omeprazole (PRILOSEC) 20 mg capsule; Take 1 Cap by mouth daily for 180 days. -     metoprolol succinate (TOPROL-XL) 50 mg XL tablet; Take 1 Tab by mouth daily for 180 days. -     cyclobenzaprine (FLEXERIL) 10 mg tablet; Take 1 Tab by mouth three (3) times daily as needed for Muscle Spasm(s) for up to 90 days. -     albuterol (PROAIR HFA) 90 mcg/actuation inhaler; Take 2 Puffs by inhalation three (3) times daily as needed for Wheezing for up to 180 days.         Health Maintenance Due   Topic Date Due    DTaP/Tdap/Td series (1 - Tdap) 12/31/1962    ZOSTER VACCINE AGE 60>  10/31/2001    GLAUCOMA SCREENING Q2Y  12/31/2006    Bone Densitometry (Dexa) Screening  12/31/2006    Pneumococcal 65+ Low/Medium Risk (1 of 2 - PCV13) 12/31/2006    MEDICARE YEARLY EXAM  03/14/2018

## 2018-06-28 NOTE — PROGRESS NOTES
Please call patient to inform her that all lab results are within normal range with the only one being elevated triglyceride. It is slightly lower than the previous result 6 months ago, which is good. Annual with healthy diet and routine exercise. Return to center in 6 months.

## 2018-07-19 DIAGNOSIS — E78.2 MIXED HYPERLIPIDEMIA: Primary | ICD-10-CM

## 2018-07-19 NOTE — TELEPHONE ENCOUNTER
Pt calling to request med refill of:  Requested Prescriptions     Pending Prescriptions Disp Refills    atorvastatin (LIPITOR) 20 mg tablet       Sig: Take 1 Tab by mouth nightly. Be Printed    Pt has 10 tabs remaining     Pts last appt was 6/19/18 & next appt [facility] for 12/19/18    Pt advised of 72 hour time frame. Please advise.

## 2018-07-23 RX ORDER — ATORVASTATIN CALCIUM 20 MG/1
20 TABLET, FILM COATED ORAL
Qty: 90 TAB | Refills: 1 | Status: SHIPPED | OUTPATIENT
Start: 2018-07-23 | End: 2019-01-05 | Stop reason: SDUPTHER

## 2018-09-05 DIAGNOSIS — G47.9 SLEEP DISORDER: ICD-10-CM

## 2018-09-05 RX ORDER — ESZOPICLONE 3 MG/1
3 TABLET, FILM COATED ORAL
Qty: 30 TAB | Refills: 2 | Status: CANCELLED | OUTPATIENT
Start: 2018-09-05

## 2018-09-11 DIAGNOSIS — G47.9 SLEEP DISORDER: Primary | ICD-10-CM

## 2018-09-11 RX ORDER — ESZOPICLONE 3 MG/1
3 TABLET, FILM COATED ORAL
Qty: 30 TAB | Refills: 0 | Status: SHIPPED | OUTPATIENT
Start: 2018-09-11 | End: 2018-10-11

## 2018-09-11 NOTE — TELEPHONE ENCOUNTER
Patient has been called and notified that rx is ready for . Verbal understanding given. Closing encounter.

## 2018-10-09 DIAGNOSIS — G47.9 SLEEP DISORDER: ICD-10-CM

## 2018-10-09 NOTE — TELEPHONE ENCOUNTER
Requested Prescriptions     Pending Prescriptions Disp Refills    eszopiclone (LUNESTA) 3 mg tablet 30 Tab 2     Sig: Take 1 Tab by mouth nightly. Max Daily Amount: 3 mg. Print      They have 8 tablets remaining. Pts last appt was 6/19/18, Next appt is scheduled for 12/19/18. Pt aware of 72 hours time frame.

## 2018-10-12 RX ORDER — ESZOPICLONE 3 MG/1
3 TABLET, FILM COATED ORAL
Qty: 30 TAB | Refills: 1 | Status: SHIPPED | OUTPATIENT
Start: 2018-10-12 | End: 2018-12-07 | Stop reason: SDUPTHER

## 2018-12-07 DIAGNOSIS — G47.9 SLEEP DISORDER: ICD-10-CM

## 2018-12-07 NOTE — TELEPHONE ENCOUNTER
Pt calling to request med refill of:  Requested Prescriptions     Pending Prescriptions Disp Refills    eszopiclone (LUNESTA) 3 mg tablet 30 Tab 1     Sig: Take 1 Tab by mouth nightly. Max Daily Amount: 3 mg. Be sent to [facility] has 7 tabs remaining     Pts last appt was 6/19/18  & next appt [facility] for 12/19/18    Pt advised of 72 hour time frame. Please advise.

## 2018-12-12 RX ORDER — ESZOPICLONE 3 MG/1
3 TABLET, FILM COATED ORAL
Qty: 30 TAB | Refills: 0 | Status: SHIPPED | OUTPATIENT
Start: 2018-12-12 | End: 2019-01-17 | Stop reason: DRUGHIGH

## 2018-12-12 NOTE — TELEPHONE ENCOUNTER
Pt called to check on status of refill. Has 2 pills left. Needs printed RX today to [facility] ([facility]     Requested Prescriptions     Pending Prescriptions Disp Refills    eszopiclone (LUNESTA) 3 mg tablet 30 Tab 1     Sig: Take 1 Tab by mouth nightly. Max Daily Amount: 3 mg.

## 2019-01-05 ENCOUNTER — HOSPITAL ENCOUNTER (OUTPATIENT)
Dept: LAB | Age: 79
Discharge: HOME | End: 2019-01-05
Payer: MEDICARE

## 2019-01-05 ENCOUNTER — OFFICE VISIT (OUTPATIENT)
Dept: FAMILY MEDICINE CLINIC | Age: 79
End: 2019-01-05

## 2019-01-05 VITALS
WEIGHT: 147 LBS | TEMPERATURE: 98.4 F | HEART RATE: 91 BPM | OXYGEN SATURATION: 95 % | HEIGHT: 65 IN | RESPIRATION RATE: 16 BRPM | DIASTOLIC BLOOD PRESSURE: 86 MMHG | SYSTOLIC BLOOD PRESSURE: 148 MMHG | BODY MASS INDEX: 24.49 KG/M2

## 2019-01-05 DIAGNOSIS — E78.2 MIXED HYPERLIPIDEMIA: ICD-10-CM

## 2019-01-05 DIAGNOSIS — G47.00 INSOMNIA, UNSPECIFIED TYPE: ICD-10-CM

## 2019-01-05 DIAGNOSIS — I10 ESSENTIAL HYPERTENSION: ICD-10-CM

## 2019-01-05 DIAGNOSIS — J01.00 ACUTE MAXILLARY SINUSITIS, RECURRENCE NOT SPECIFIED: Primary | ICD-10-CM

## 2019-01-05 DIAGNOSIS — M85.89 OTHER SPECIFIED DISORDERS OF BONE DENSITY AND STRUCTURE, MULTIPLE SITES: ICD-10-CM

## 2019-01-05 DIAGNOSIS — E03.9 ACQUIRED HYPOTHYROIDISM: ICD-10-CM

## 2019-01-05 DIAGNOSIS — K21.9 GASTROESOPHAGEAL REFLUX DISEASE WITHOUT ESOPHAGITIS: ICD-10-CM

## 2019-01-05 DIAGNOSIS — M85.80 OSTEOPENIA, UNSPECIFIED LOCATION: ICD-10-CM

## 2019-01-05 DIAGNOSIS — M54.2 NECK PAIN: ICD-10-CM

## 2019-01-05 LAB
ALBUMIN SERPL-MCNC: 4.2 G/DL (ref 3.4–5)
ALBUMIN/GLOB SERPL: 1.1 {RATIO} (ref 0.8–1.7)
ALP SERPL-CCNC: 97 U/L (ref 45–117)
ALT SERPL-CCNC: 34 U/L (ref 13–56)
ANION GAP SERPL CALC-SCNC: 8 MMOL/L (ref 3–18)
AST SERPL-CCNC: 24 U/L (ref 15–37)
BASOPHILS # BLD: 0 K/UL (ref 0–0.1)
BASOPHILS NFR BLD: 1 % (ref 0–2)
BILIRUB SERPL-MCNC: 1.6 MG/DL (ref 0.2–1)
BUN SERPL-MCNC: 15 MG/DL (ref 7–18)
BUN/CREAT SERPL: 23 (ref 12–20)
CALCIUM SERPL-MCNC: 9 MG/DL (ref 8.5–10.1)
CHLORIDE SERPL-SCNC: 106 MMOL/L (ref 100–108)
CHOLEST SERPL-MCNC: 155 MG/DL
CO2 SERPL-SCNC: 26 MMOL/L (ref 21–32)
CREAT SERPL-MCNC: 0.66 MG/DL (ref 0.6–1.3)
DIFFERENTIAL METHOD BLD: NORMAL
EOSINOPHIL # BLD: 0.2 K/UL (ref 0–0.4)
EOSINOPHIL NFR BLD: 3 % (ref 0–5)
ERYTHROCYTE [DISTWIDTH] IN BLOOD BY AUTOMATED COUNT: 13.8 % (ref 11.6–14.5)
GLOBULIN SER CALC-MCNC: 3.9 G/DL (ref 2–4)
GLUCOSE SERPL-MCNC: 106 MG/DL (ref 74–99)
HCT VFR BLD AUTO: 39.9 % (ref 35–45)
HDLC SERPL-MCNC: 48 MG/DL (ref 40–60)
HDLC SERPL: 3.2 {RATIO} (ref 0–5)
HGB BLD-MCNC: 13.2 G/DL (ref 12–16)
LDLC SERPL CALC-MCNC: 80 MG/DL (ref 0–100)
LIPID PROFILE,FLP: NORMAL
LYMPHOCYTES # BLD: 1.8 K/UL (ref 0.9–3.6)
LYMPHOCYTES NFR BLD: 28 % (ref 21–52)
MCH RBC QN AUTO: 30.6 PG (ref 24–34)
MCHC RBC AUTO-ENTMCNC: 33.1 G/DL (ref 31–37)
MCV RBC AUTO: 92.6 FL (ref 74–97)
MONOCYTES # BLD: 0.6 K/UL (ref 0.05–1.2)
MONOCYTES NFR BLD: 10 % (ref 3–10)
NEUTS SEG # BLD: 3.8 K/UL (ref 1.8–8)
NEUTS SEG NFR BLD: 58 % (ref 40–73)
PLATELET # BLD AUTO: 228 K/UL (ref 135–420)
PMV BLD AUTO: 9.6 FL (ref 9.2–11.8)
POTASSIUM SERPL-SCNC: 4.1 MMOL/L (ref 3.5–5.5)
PROT SERPL-MCNC: 8.1 G/DL (ref 6.4–8.2)
RBC # BLD AUTO: 4.31 M/UL (ref 4.2–5.3)
SODIUM SERPL-SCNC: 140 MMOL/L (ref 136–145)
TRIGL SERPL-MCNC: 135 MG/DL (ref ?–150)
TSH SERPL DL<=0.05 MIU/L-ACNC: 0.76 UIU/ML (ref 0.36–3.74)
VLDLC SERPL CALC-MCNC: 27 MG/DL
WBC # BLD AUTO: 6.4 K/UL (ref 4.6–13.2)

## 2019-01-05 PROCEDURE — 80061 LIPID PANEL: CPT

## 2019-01-05 PROCEDURE — 36415 COLL VENOUS BLD VENIPUNCTURE: CPT

## 2019-01-05 PROCEDURE — 80053 COMPREHEN METABOLIC PANEL: CPT

## 2019-01-05 PROCEDURE — 85025 COMPLETE CBC W/AUTO DIFF WBC: CPT

## 2019-01-05 PROCEDURE — 84443 ASSAY THYROID STIM HORMONE: CPT

## 2019-01-05 RX ORDER — OMEPRAZOLE 20 MG/1
20 CAPSULE, DELAYED RELEASE ORAL DAILY
Qty: 90 CAP | Refills: 1 | Status: SHIPPED | OUTPATIENT
Start: 2019-01-05 | End: 2019-07-04

## 2019-01-05 RX ORDER — AMOXICILLIN AND CLAVULANATE POTASSIUM 875; 125 MG/1; MG/1
1 TABLET, FILM COATED ORAL 2 TIMES DAILY
Qty: 20 TAB | Refills: 0 | Status: SHIPPED | OUTPATIENT
Start: 2019-01-05 | End: 2019-01-15

## 2019-01-05 RX ORDER — CYCLOBENZAPRINE HCL 10 MG
10 TABLET ORAL
Qty: 180 TAB | Refills: 1 | Status: SHIPPED | OUTPATIENT
Start: 2019-01-05 | End: 2019-04-05

## 2019-01-05 RX ORDER — LEVOTHYROXINE SODIUM 112 UG/1
112 TABLET ORAL
Qty: 90 TAB | Refills: 1 | Status: SHIPPED | OUTPATIENT
Start: 2019-01-05 | End: 2019-07-04

## 2019-01-05 RX ORDER — METOPROLOL SUCCINATE 50 MG/1
50 TABLET, EXTENDED RELEASE ORAL DAILY
Qty: 90 TAB | Refills: 1 | Status: SHIPPED | OUTPATIENT
Start: 2019-01-05 | End: 2019-07-04

## 2019-01-05 RX ORDER — ATORVASTATIN CALCIUM 20 MG/1
20 TABLET, FILM COATED ORAL
Qty: 90 TAB | Refills: 1 | Status: SHIPPED | OUTPATIENT
Start: 2019-01-05 | End: 2019-07-04

## 2019-01-05 NOTE — PROGRESS NOTES
Jackie Gentile Chief Complaint Patient presents with [facility] Establish Care  Hypertension  
  management  Hypothyroidism  
  management  Cholesterol Problem  
  management  GERD  
  management' Vitals:  
 01/05/19 0687 BP: 148/86 Pulse: 91  
Resp: 16 Temp: 98.4 °F (36.9 °C) TempSrc: Oral  
SpO2: 95% Weight: 147 lb (66.7 kg) Height: 5' 4.5\" (1.638 m) PainSc:   5 PainLoc: Back HPI:Patient is here for her 6 months follow-up for hypertension hyperlipidemia and hypothyroidism also she need medication refill. She has been stable. She has been having cough and cold for over 1 week started with sore throat nasal congestion and cough, patient does have sinus pain and headaches and facial pressure for about 1 week, no fever no chills no chest pain no shortness of breath no nausea no vomiting. Past Medical History:  
Diagnosis Date  Asthma  Hypercholesterolemia  Hypertension  Injury of shoulder   
 right should d/t fall  Lung mass  Osteoarthrosis involving more than one site   
 severe in neck. also in hip and thigh  Thyroid disease Past Surgical History:  
Procedure Laterality Date  HX BARTHOLIN CYST MARSUPIALIZATION    
 x2  
 HX HIP REPLACEMENT Left 2015  HX HYSTERECTOMY  2012  HX OTHER SURGICAL    
 face lift  HX SEPTOPLASTY  HX TUBAL LIGATION Social History Tobacco Use  Smoking status: Never Smoker  Smokeless tobacco: Never Used Substance Use Topics  Alcohol use: No  
 
 
Family History Problem Relation Age of Onset [facility] Arthritis-rheumatoid Sister Review of Systems Constitutional: Negative for chills, fever, malaise/fatigue and weight loss. HENT: Positive for congestion, sinus pain and sore throat. Negative for ear discharge, ear pain, hearing loss and nosebleeds. Eyes: Negative for blurred vision, double vision and discharge. Respiratory: Positive for cough and sputum production. Negative for hemoptysis, shortness of breath and wheezing. Cardiovascular: Negative for chest pain, palpitations, claudication and leg swelling. Gastrointestinal: Negative for abdominal pain, constipation, diarrhea, nausea and vomiting. Genitourinary: Negative for dysuria, frequency, hematuria and urgency. Musculoskeletal: Positive for joint pain, myalgias and neck pain. Negative for back pain and falls. Skin: Negative for itching and rash. Neurological: Negative for dizziness, tingling, sensory change, speech change, focal weakness, weakness and headaches. Psychiatric/Behavioral: Negative for depression and suicidal ideas. Physical Exam  
Constitutional: She is oriented to person, place, and time. She appears well-developed and well-nourished. No distress. HENT:  
Head: Normocephalic and atraumatic. Mouth/Throat: Oropharynx is clear and moist. No oropharyngeal exudate. Bilateral maxillary and frontal sinus tenderness Eyes: Conjunctivae are normal. Pupils are equal, round, and reactive to light. Right eye exhibits no discharge. Left eye exhibits no discharge. No scleral icterus. Neck: Normal range of motion. Neck supple. No thyromegaly present. Bilateral submandibular tender lymph nodes Cardiovascular: Normal rate, regular rhythm and normal heart sounds. No murmur heard. Pulmonary/Chest: Effort normal and breath sounds normal. No respiratory distress. She has no wheezes. She has no rales. She exhibits no tenderness. Abdominal: Soft. She exhibits no distension. There is no tenderness. There is no rebound. Musculoskeletal: Normal range of motion. She exhibits no edema, tenderness or deformity. Lymphadenopathy:  
  She has no cervical adenopathy. Neurological: She is alert and oriented to person, place, and time. No cranial nerve deficit.  Coordination normal.  
 Skin: Skin is warm and dry. No rash noted. She is not diaphoretic. No erythema. No pallor. Psychiatric: She has a normal mood and affect. Her behavior is normal. Judgment and thought content normal.  
Nursing note and vitals reviewed. Patient will continue same medications blood work will be done today and reevaluation according to blood results. Consider adding calcium channel blocker or ACE inhibitors for blood pressure treatment and reducing metoprolol dose in the near future. Assessment and plan  
 
Plan of care has been discussed with the patient, he agrees to the plan and verbalized understanding. All his questions were answered More than 50% of the time spent in this visit was counseling the patient about  illness and treatment options 1. Mixed hyperlipidemia 
 
- atorvastatin (LIPITOR) 20 mg tablet; Take 1 Tab by mouth nightly for 180 days. Dispense: 90 Tab; Refill: 1 
- CBC WITH AUTOMATED DIFF; Future - METABOLIC PANEL, COMPREHENSIVE; Future 2. Acquired hypothyroidism 
 
- levothyroxine (SYNTHROID) 112 mcg tablet; Take 1 Tab by mouth Daily (before breakfast) for 180 days. Dispense: 90 Tab; Refill: 1 
- CBC WITH AUTOMATED DIFF; Future - LIPID PANEL; Future 
- TSH 3RD GENERATION; Future 3. Insomnia, unspecified type 4. Gastroesophageal reflux disease without esophagitis 
 
- omeprazole (PRILOSEC) 20 mg capsule; Take 1 Cap by mouth daily for 180 days. Dispense: 90 Cap; Refill: 1 5. Essential hypertension 
- metoprolol succinate (TOPROL-XL) 50 mg XL tablet; Take 1 Tab by mouth daily for 180 days. Dispense: 90 Tab; Refill: 1 
- CBC WITH AUTOMATED DIFF; Future - METABOLIC PANEL, COMPREHENSIVE; Future 6. Neck pain 
 
- cyclobenzaprine (FLEXERIL) 10 mg tablet; Take 1 Tab by mouth three (3) times daily as needed for Muscle Spasm(s) for up to 90 days. Dispense: 180 Tab; Refill: 1 
 
7. Osteopenia, unspecified location Last bone density scan was done 3 years ago - DEXA BONE DENSITY STUDY AXIAL; Future - METABOLIC PANEL, COMPREHENSIVE; Future 8. Acute maxillary sinusitis, recurrence not specified Patient advised to rinse her sinuses with normal saline. Take antibiotics and follow-up if not better in 5 days 
 
- amoxicillin-clavulanate (AUGMENTIN) 875-125 mg per tablet; Take 1 Tab by mouth two (2) times a day for 10 days. Dispense: 20 Tab; Refill: 0 
 
9. Other specified disorders of bone density and structure, multiple sites - DEXA BONE DENSITY STUDY AXIAL; Future Current Outpatient Medications Medication Sig Dispense Refill  atorvastatin (LIPITOR) 20 mg tablet Take 1 Tab by mouth nightly for 180 days. 90 Tab 1  
 omeprazole (PRILOSEC) 20 mg capsule Take 1 Cap by mouth daily for 180 days. 90 Cap 1  
 metoprolol succinate (TOPROL-XL) 50 mg XL tablet Take 1 Tab by mouth daily for 180 days. 90 Tab 1  
 levothyroxine (SYNTHROID) 112 mcg tablet Take 1 Tab by mouth Daily (before breakfast) for 180 days. 90 Tab 1  cyclobenzaprine (FLEXERIL) 10 mg tablet Take 1 Tab by mouth three (3) times daily as needed for Muscle Spasm(s) for up to 90 days. 180 Tab 1  
 amoxicillin-clavulanate (AUGMENTIN) 875-125 mg per tablet Take 1 Tab by mouth two (2) times a day for 10 days. 20 Tab 0  
 eszopiclone (LUNESTA) 3 mg tablet Take 1 Tab by mouth nightly. Max Daily Amount: 3 mg. 30 Tab 0  
 albuterol (PROAIR HFA) 90 mcg/actuation inhaler Take 2 Puffs by inhalation three (3) times daily as needed for Wheezing for up to 180 days. 1 Inhaler 1 Patient Active Problem List  
 Diagnosis Date Noted  Insomnia 01/05/2019  Vitamin D deficiency 01/19/2018  Chronic vertigo 01/19/2018  Gastroesophageal reflux disease without esophagitis 01/19/2018  Hyperlipidemia 12/12/2017  Acquired hypothyroidism 12/12/2017  Sleep disorder 12/12/2017  Essential hypertension 12/12/2017 Results for orders placed or performed during the hospital encounter of 06/19/18 LIPID PANEL Result Value Ref Range LIPID PROFILE Cholesterol, total 180 <200 MG/DL Triglyceride 167 (H) <150 MG/DL  
 HDL Cholesterol 52 40 - 60 MG/DL  
 LDL, calculated 94.6 0 - 100 MG/DL VLDL, calculated 33.4 MG/DL  
 CHOL/HDL Ratio 3.5 0 - 5.0 METABOLIC PANEL, COMPREHENSIVE Result Value Ref Range Sodium 141 136 - 145 mmol/L Potassium 3.9 3.5 - 5.5 mmol/L Chloride 105 100 - 108 mmol/L  
 CO2 28 21 - 32 mmol/L Anion gap 8 3.0 - 18 mmol/L Glucose 100 (H) 74 - 99 mg/dL BUN 10 7.0 - 18 MG/DL Creatinine 0.65 0.6 - 1.3 MG/DL  
 BUN/Creatinine ratio 15 12 - 20 GFR est AA >60 >60 ml/min/1.73m2 GFR est non-AA >60 >60 ml/min/1.73m2 Calcium 9.2 8.5 - 10.1 MG/DL Bilirubin, total 1.7 (H) 0.2 - 1.0 MG/DL  
 ALT (SGPT) 25 13 - 56 U/L  
 AST (SGOT) 22 15 - 37 U/L Alk. phosphatase 82 45 - 117 U/L Protein, total 8.0 6.4 - 8.2 g/dL Albumin 4.4 3.4 - 5.0 g/dL Globulin 3.6 2.0 - 4.0 g/dL A-G Ratio 1.2 0.8 - 1.7 TSH 3RD GENERATION Result Value Ref Range TSH 0.65 0.36 - 3.74 uIU/mL No visits with results within 3 Month(s) from this visit. Latest known visit with results is:  
Hospital Outpatient Visit on 06/19/2018 Component Date Value Ref Range Status  LIPID PROFILE 06/19/2018        Final  
 Cholesterol, total 06/19/2018 180  <200 MG/DL Final  
 Triglyceride 06/19/2018 167* <150 MG/DL Final  
 Comment: The drugs N-acetylcysteine (NAC) and 
Metamiszole have been found to cause falsely 
low results in this chemical assay. Please 
be sure to submit blood samples obtained BEFORE administration of either of these 
drugs to assure correct results.  
  
 HDL Cholesterol 06/19/2018 52  40 - 60 MG/DL Final  
 LDL, calculated 06/19/2018 94.6  0 - 100 MG/DL Final  
 VLDL, calculated 06/19/2018 33.4  MG/DL Final  
 CHOL/HDL Ratio 06/19/2018 3.5  0 - 5.0   Final  
 Sodium 06/19/2018 141  136 - 145 mmol/L Final  
  Potassium 06/19/2018 3.9  3.5 - 5.5 mmol/L Final  
 Chloride 06/19/2018 105  100 - 108 mmol/L Final  
 CO2 06/19/2018 28  21 - 32 mmol/L Final  
 Anion gap 06/19/2018 8  3.0 - 18 mmol/L Final  
 Glucose 06/19/2018 100* 74 - 99 mg/dL Final  
 BUN 06/19/2018 10  7.0 - 18 MG/DL Final  
 Creatinine 06/19/2018 0.65  0.6 - 1.3 MG/DL Final  
 BUN/Creatinine ratio 06/19/2018 15  12 - 20   Final  
 GFR est AA 06/19/2018 >60  >60 ml/min/1.73m2 Final  
 GFR est non-AA 06/19/2018 >60  >60 ml/min/1.73m2 Final  
 Comment: (NOTE) Estimated GFR is calculated using the Modification of Diet in Renal  
Disease (MDRD) Study equation, reported for both  Americans [facility]) and non- Americans (GFRNA), and normalized to 1.73m2  
body surface area. The physician must decide which value applies to  
the patient. The MDRD study equation should only be used in  
individuals age 25 or older. It has not been validated for the  
following: pregnant women, patients with serious comorbid conditions,  
or on certain medications, or persons with extremes of body size,  
muscle mass, or nutritional status.  Calcium 06/19/2018 9.2  8.5 - 10.1 MG/DL Final  
 Bilirubin, total 06/19/2018 1.7* 0.2 - 1.0 MG/DL Final  
 ALT (SGPT) 06/19/2018 25  13 - 56 U/L Final  
 AST (SGOT) 06/19/2018 22  15 - 37 U/L Final  
 Alk. phosphatase 06/19/2018 82  45 - 117 U/L Final  
 Protein, total 06/19/2018 8.0  6.4 - 8.2 g/dL Final  
 Albumin 06/19/2018 4.4  3.4 - 5.0 g/dL Final  
 Globulin 06/19/2018 3.6  2.0 - 4.0 g/dL Final  
 A-G Ratio 06/19/2018 1.2  0.8 - 1.7   Final  
 TSH 06/19/2018 0.65  0.36 - 3.74 uIU/mL Final  
  
 
 
Follow-up Disposition: Not on File

## 2019-01-09 DIAGNOSIS — G47.9 SLEEP DISORDER: ICD-10-CM

## 2019-01-09 NOTE — TELEPHONE ENCOUNTER
Requested Prescriptions     Pending Prescriptions Disp Refills    eszopiclone (LUNESTA) 3 mg tablet 30 Tab 0     Sig: Take 1 Tab by mouth nightly. Max Daily Amount: 3 mg. Print      They have 5 tablets remaining. Pts last appt was 1/5/19, Next appt is scheduled for 7/6/19. Pt aware of 72 hours time frame.

## 2019-01-15 NOTE — PROGRESS NOTES
Overall blood work is unremarkable, continue Lipitor, fasting blood sugar slightly elevated decrease sugar intake.

## 2019-01-17 DIAGNOSIS — G47.9 SLEEP DISORDER: ICD-10-CM

## 2019-01-17 RX ORDER — ESZOPICLONE 2 MG/1
2 TABLET, FILM COATED ORAL
Qty: 30 TAB | Refills: 0 | OUTPATIENT
Start: 2019-01-17

## 2019-01-17 RX ORDER — ESZOPICLONE 2 MG/1
2 TABLET, FILM COATED ORAL
Qty: 30 TAB | Refills: 0 | Status: SHIPPED | OUTPATIENT
Start: 2019-01-17 | End: 2019-02-06 | Stop reason: DRUGHIGH

## 2019-01-17 RX ORDER — ESZOPICLONE 3 MG/1
3 TABLET, FILM COATED ORAL
Qty: 30 TAB | Refills: 0 | Status: CANCELLED | OUTPATIENT
Start: 2019-01-17

## 2019-01-17 NOTE — TELEPHONE ENCOUNTER
Pt's daughter came to office stating pt rcv/d rx for lunesta 2 mg & she was taking 3 mg for years & would like to know what she has to do to get 3 mg. Please call pt ASAP. She would like a return call by the end of day tomorrow.

## 2019-01-17 NOTE — TELEPHONE ENCOUNTER
Please sign order for patient to get medication printed    Requested Prescriptions     Pending Prescriptions Disp Refills    eszopiclone (LUNESTA) 2 mg tablet 30 Tab 0     Sig: Take 1 Tab by mouth nightly. Max Daily Amount: 2 mg.

## 2019-01-18 NOTE — TELEPHONE ENCOUNTER
Called patient and [facility] and [facility] for patient. Per Dr. Yolis Faust, patient's Lunesta dose has been reduced because her previous does exceeds the recommended safe dose for someone her age.

## 2019-02-06 ENCOUNTER — OFFICE VISIT (OUTPATIENT)
Dept: FAMILY MEDICINE CLINIC | Age: 79
End: 2019-02-06

## 2019-02-06 VITALS
SYSTOLIC BLOOD PRESSURE: 178 MMHG | TEMPERATURE: 98 F | WEIGHT: 147.2 LBS | BODY MASS INDEX: 24.53 KG/M2 | DIASTOLIC BLOOD PRESSURE: 104 MMHG | RESPIRATION RATE: 16 BRPM | HEART RATE: 76 BPM | OXYGEN SATURATION: 95 % | HEIGHT: 65 IN

## 2019-02-06 DIAGNOSIS — Z00.00 WELL WOMAN EXAM (NO GYNECOLOGICAL EXAM): Primary | ICD-10-CM

## 2019-02-06 DIAGNOSIS — G47.9 SLEEP DISORDER: ICD-10-CM

## 2019-02-06 DIAGNOSIS — Z76.89 ENCOUNTER TO ESTABLISH CARE: ICD-10-CM

## 2019-02-06 RX ORDER — ESZOPICLONE 1 MG/1
1-2 TABLET, FILM COATED ORAL
Qty: 60 TAB | Refills: 0 | Status: SHIPPED | OUTPATIENT
Start: 2019-02-06

## 2019-02-06 RX ORDER — ALBUTEROL SULFATE 90 UG/1
AEROSOL, METERED RESPIRATORY (INHALATION)
COMMUNITY

## 2019-02-06 NOTE — PATIENT INSTRUCTIONS
Well Visit, Over 72: Care Instructions Your Care Instructions Physical exams can help you stay healthy. Your doctor has checked your overall health and may have suggested ways to take good care of yourself. He or she also may have recommended tests. At home, you can help prevent illness with healthy eating, regular exercise, and other steps. Follow-up care is a key part of your treatment and safety. Be sure to make and go to all appointments, and call your doctor if you are having problems. It's also a good idea to know your test results and keep a list of the medicines you take. How can you care for yourself at home? · Reach and stay at a healthy weight. This will lower your risk for many problems, such as obesity, diabetes, heart disease, and high blood pressure. · Get at least 30 minutes of exercise on most days of the week. Walking is a good choice. You also may want to do other activities, such as running, swimming, cycling, or playing tennis or team sports. · Do not smoke. Smoking can make health problems worse. If you need help quitting, talk to your doctor about stop-smoking programs and medicines. These can increase your chances of quitting for good. · Protect your skin from too much sun. When you're outdoors from 10 a.m. to 4 p.m., stay in the shade or cover up with clothing and a hat with a wide brim. Wear sunglasses that block UV rays. Even when it's cloudy, put broad-spectrum sunscreen (SPF 30 or higher) on any exposed skin. · See a dentist one or two times a year for checkups and to have your teeth cleaned. · Wear a seat belt in the car. · Limit alcohol to 2 drinks a day for men and 1 drink a day for women. Too much alcohol can cause health problems. Follow your doctor's advice about when to have certain tests. These tests can spot problems early. For men and women · Cholesterol.  Your doctor will tell you how often to have this done based on your overall health and other things that can increase your risk for heart attack and stroke. · Blood pressure. Have your blood pressure checked during a routine doctor visit. Your doctor will tell you how often to check your blood pressure based on your age, your blood pressure results, and other factors. · Diabetes. Ask your doctor whether you should have tests for diabetes. · Vision. Experts recommend that you have yearly exams for glaucoma and other age-related eye problems. · Hearing. Tell your doctor if you notice any change in your hearing. You can have tests to find out how well you hear. · Colon cancer tests. Keep having colon cancer tests as your doctor recommends. You can have one of several types of tests. · Heart attack and stroke risk. At least every 4 to 6 years, you should have your risk for heart attack and stroke assessed. Your doctor uses factors such as your age, blood pressure, cholesterol, and whether you smoke or have diabetes to show what your risk for a heart attack or stroke is over the next 10 years. · Osteoporosis. Talk to your doctor about whether you should have a bone density test to find out whether you have thinning bones. Also ask your doctor about whether you should take calcium and vitamin D supplements. For women · Pap test and pelvic exam. You may no longer need a Pap test. Talk with your doctor about whether to stop or continue to have Pap tests. · Breast exam and mammogram. Ask how often you should have a mammogram, which is an X-ray of your breasts. A mammogram can spot breast cancer before it can be felt and when it is easiest to treat. · Thyroid disease. Talk to your doctor about whether to have your thyroid checked as part of a regular physical exam. Women have an increased chance of a thyroid problem. For men · Prostate exam. Talk to your doctor about whether you should have a blood test (called a PSA test) for prostate cancer.  Experts disagree on whether men should have this test. Some experts recommend that you discuss the benefits and risks of the test with your doctor. · Abdominal aortic aneurysm. Ask your doctor whether you should have a test to check for an aneurysm. You may need a test if you ever smoked or if your parent, brother, sister, or child has had an aneurysm. When should you call for help? Watch closely for changes in your health, and be sure to contact your doctor if you have any problems or symptoms that concern you. Where can you learn more? Go to http://casie-kristi.info/. Enter J598 in the search box to learn more about \"Well Visit, Over 65: Care Instructions. \" Current as of: March 28, 2018 Content Version: 11.9 © 2009-6873 [employer]. Care instructions adapted under license by [employer] (which disclaims liability or warranty for this information). If you have questions about a medical condition or this instruction, always ask your healthcare professional. [place] [address] any warranty or liability for your use of this information. Insomnia: Care Instructions Your Care Instructions Insomnia is the inability to sleep well. It is a common problem for most people at some time. Insomnia may make it hard for you to get to sleep, stay asleep, or sleep as long as you need to. This can make you tired and grouchy during the day. It can also make you forgetful, less effective at work, and unhappy. Insomnia can be caused by conditions such as depression or anxiety. Pain can also affect your ability to sleep. When these problems are solved, the insomnia usually clears up. But sometimes bad sleep habits can cause insomnia. If insomnia is affecting your work or your enjoyment of life, you can take steps to improve your sleep. Follow-up care is a key part of your treatment and safety.  Be sure to make and go to all appointments, and call your doctor if you are having problems. It's also a good idea to know your test results and keep a list of the medicines you take. How can you care for yourself at home? What to avoid · Do not have drinks with caffeine, such as coffee or black tea, for 8 hours before bed. · Do not smoke or use other types of tobacco near bedtime. Nicotine is a stimulant and can keep you awake. · Avoid drinking alcohol late in the evening, because it can cause you to wake in the middle of the night. · Do not eat a big meal close to bedtime. If you are hungry, eat a light snack. · Do not drink a lot of water close to bedtime, because the need to urinate may wake you up during the night. · Do not read or watch TV in bed. Use the bed only for sleeping and sexual activity. What to try · Go to bed at the same time every night, and wake up at the same time every morning. Do not take naps during the day. · Keep your bedroom quiet, dark, and cool. · Sleep on a comfortable pillow and mattress. · If watching the clock makes you anxious, turn it facing away from you so you cannot see the time. · If you worry when you lie down, start a worry book. Well before bedtime, write down your worries, and then set the book and your concerns aside. · Try meditation or other relaxation techniques before you go to bed. · If you cannot fall asleep, get up and go to another room until you feel sleepy. Do something relaxing. Repeat your bedtime routine before you go to bed again. · Make your house quiet and calm about an hour before bedtime. Turn down the lights, turn off the TV, log off the computer, and turn down the volume on music. This can help you relax after a busy day. When should you call for help?  
Watch closely for changes in your health, and be sure to contact your doctor if: 
  · Your efforts to improve your sleep do not work.  
  · Your insomnia gets worse.  
  · You have been feeling down, depressed, or hopeless or have lost interest in things that you usually enjoy. Where can you learn more? Go to http://casie-kristi.info/. Enter P513 in the search box to learn more about \"Insomnia: Care Instructions. \" Current as of: June 28, 2018 Content Version: 11.9 © 8784-2635 "[employer]. Care instructions adapted under license by [facility] (which disclaims liability or warranty for this information). If you have questions about a medical condition or this instruction, always ask your healthcare professional. [address] any warranty or liability for your use of this information.

## 2019-02-06 NOTE — PROGRESS NOTES
Chief Complaint Patient presents with [facility] SLEEP CURRENTLY TAKING LUNESTA 2MG AND AVERAGE 5 HRS OF SLEEP SENSE JAN 17, 2019 1. Have you been to the ER, urgent care clinic since your last visit? Hospitalized since your last visit? No 
 
2. Have you seen or consulted any other health care providers outside of the [address] [place] [address] since your last visit? Include any pap smears or colon screening.  No

## 2019-02-06 NOTE — PROGRESS NOTES
Subjective:  
69 y.o. female for Well Woman Check. Her gyne and breast care is done elsewhere by her Ob-Gyne physician. Patient Active Problem List  
Diagnosis Code  Hyperlipidemia E78.5  Acquired hypothyroidism E03.9  Sleep disorder G47.9  Essential hypertension I10  Vitamin D deficiency E55.9  Chronic vertigo R42  Gastroesophageal reflux disease without esophagitis K21.9  Insomnia G47.00 Current Outpatient Medications Medication Sig Dispense Refill  albuterol (PROVENTIL HFA, VENTOLIN HFA, PROAIR HFA) 90 mcg/actuation inhaler Take  by inhalation.  eszopiclone (LUNESTA) 1 mg tablet Take 1-2 Tabs by mouth nightly. Max Daily Amount: 2 mg. 60 Tab 0  
 atorvastatin (LIPITOR) 20 mg tablet Take 1 Tab by mouth nightly for 180 days. 90 Tab 1  
 omeprazole (PRILOSEC) 20 mg capsule Take 1 Cap by mouth daily for 180 days. 90 Cap 1  
 metoprolol succinate (TOPROL-XL) 50 mg XL tablet Take 1 Tab by mouth daily for 180 days. 90 Tab 1  
 levothyroxine (SYNTHROID) 112 mcg tablet Take 1 Tab by mouth Daily (before breakfast) for 180 days. 90 Tab 1  cyclobenzaprine (FLEXERIL) 10 mg tablet Take 1 Tab by mouth three (3) times daily as needed for Muscle Spasm(s) for up to 90 days. 180 Tab 1 Allergies Allergen Reactions  Levofloxacin Anaphylaxis  Cefaclor Unknown (comments)  Other Plant, Animal, Environmental Runny Nose Itchy, waterly eye  Trazodone Vertigo Light-headed Past Medical History:  
Diagnosis Date  Asthma  Hypercholesterolemia  Hypertension  Injury of shoulder   
 right should d/t fall  Lung mass  Osteoarthrosis involving more than one site   
 severe in neck. also in hip and thigh  Thyroid disease Past Surgical History:  
Procedure Laterality Date  HX BARTHOLIN CYST MARSUPIALIZATION    
 x2  
 HX HIP REPLACEMENT Left 2015  HX HYSTERECTOMY  2012  HX OTHER SURGICAL    
 face lift  HX SEPTOPLASTY  HX TUBAL LIGATION Family History Problem Relation Age of Onset [facility] Arthritis-rheumatoid Sister Social History Tobacco Use  Smoking status: Former Smoker  Smokeless tobacco: Never Used  Tobacco comment: HAS NOT SMOKE SENSE 1997 Substance Use Topics  Alcohol use: No  
  
 
Lab Results Component Value Date/Time WBC 6.4 01/05/2019 09:05 AM  
 HGB 13.2 01/05/2019 09:05 AM  
 HCT 39.9 01/05/2019 09:05 AM  
 PLATELET 180 24/72/5191 09:05 AM  
 MCV 92.6 01/05/2019 09:05 AM  
 
Lab Results Component Value Date/Time ALT (SGPT) 34 01/05/2019 09:05 AM  
 AST (SGOT) 24 01/05/2019 09:05 AM  
 Alk. phosphatase 97 01/05/2019 09:05 AM  
 Bilirubin, direct 0.2 07/19/2010 08:16 AM  
 Bilirubin, total 1.6 (H) 01/05/2019 09:05 AM  
 Albumin 4.2 01/05/2019 09:05 AM  
 Protein, total 8.1 01/05/2019 09:05 AM  
 PLATELET 146 64/67/1882 09:05 AM  
 
Lab Results Component Value Date/Time Sodium 140 01/05/2019 09:05 AM  
 Potassium 4.1 01/05/2019 09:05 AM  
 Chloride 106 01/05/2019 09:05 AM  
 CO2 26 01/05/2019 09:05 AM  
 Anion gap 8 01/05/2019 09:05 AM  
 Glucose 106 (H) 01/05/2019 09:05 AM  
 BUN 15 01/05/2019 09:05 AM  
 Creatinine 0.66 01/05/2019 09:05 AM  
 BUN/Creatinine ratio 23 (H) 01/05/2019 09:05 AM  
 GFR est AA >60 01/05/2019 09:05 AM  
 GFR est non-AA >60 01/05/2019 09:05 AM  
 Calcium 9.0 01/05/2019 09:05 AM  
 Bilirubin, total 1.6 (H) 01/05/2019 09:05 AM  
 ALT (SGPT) 34 01/05/2019 09:05 AM  
 AST (SGOT) 24 01/05/2019 09:05 AM  
 Alk. phosphatase 97 01/05/2019 09:05 AM  
 Protein, total 8.1 01/05/2019 09:05 AM  
 Albumin 4.2 01/05/2019 09:05 AM  
 Globulin 3.9 01/05/2019 09:05 AM  
 A-G Ratio 1.1 01/05/2019 09:05 AM  
  
No results found for: HBA1C, LLR5ZIHV, HGBE8, GSX2AETO, WXC3LCLC, EFT8KPER Specific concerns today: 
 Dosage of Lunesta Noticed insomnia first in about 2008 when her daughter disappeared Never sought therapy or any additional help Found daughter 17 years later, she is in the area but does not talk with her, sees her around sometimes Tried melatonin in the past 
1st MD started her at 3mg dose Has been on it for 12-15 years Changed to a new MD in January who said that dose was too high for her age and decreased it to 2mg Review of Systems Review of Systems Constitutional: Negative. HENT: Negative. Eyes: Negative. Respiratory: Negative. Cardiovascular: Negative. Gastrointestinal: Positive for heartburn. Takes tums Genitourinary: Negative. Musculoskeletal: Positive for joint pain. Joint pain in hip due to hip replacement History of broken shoulder and neck Skin: Negative. Neurological: Positive for headaches. Psychiatric/Behavioral: Negative for depression, hallucinations, memory loss, substance abuse and suicidal ideas. The patient has insomnia. The patient is not nervous/anxious. Objective:  
Blood pressure (!) 178/104, pulse 76, temperature 98 °F (36.7 °C), temperature source Oral, resp. rate 16, height 5' 4.5\" (1.638 m), weight 147 lb 3.2 oz (66.8 kg), SpO2 95 %. Physical Examination:  
Physical Exam  
Constitutional: She is oriented to person, place, and time and well-developed, well-nourished, and in no distress. Vital signs are normal.  
HENT:  
Head: Normocephalic and atraumatic. Right Ear: Hearing, tympanic membrane, external ear and ear canal normal.  
Left Ear: Hearing, tympanic membrane, external ear and ear canal normal.  
Nose: Nose normal.  
Mouth/Throat: Uvula is midline, oropharynx is clear and moist and mucous membranes are normal.  
Eyes: Conjunctivae, EOM and lids are normal. Pupils are equal, round, and reactive to light. Neck: Trachea normal and normal range of motion. Neck supple. No JVD present. Carotid bruit is not present. No tracheal deviation present. No thyroid mass and no thyromegaly present. Cardiovascular: Normal rate, regular rhythm, S1 normal, S2 normal, normal heart sounds and intact distal pulses. Pulmonary/Chest: Effort normal and breath sounds normal.  
Abdominal: Soft. Normal appearance and bowel sounds are normal. There is no tenderness. There is no CVA tenderness. Musculoskeletal: Normal range of motion. Lymphadenopathy:  
     Head (right side): No submental, no submandibular, no tonsillar, no preauricular, no posterior auricular and no occipital adenopathy present. Head (left side): No submental, no submandibular, no tonsillar, no preauricular, no posterior auricular and no occipital adenopathy present. She has no cervical adenopathy. Neurological: She is alert and oriented to person, place, and time. She has normal motor skills and normal strength. She has a normal Romberg Test. Gait normal.  
Able to stand on one leg without falling Able to walk on toes and heels Skin: Skin is warm and dry. Psychiatric: Mood, memory, affect and judgment normal.  
Nursing note and vitals reviewed. Assessment/Plan:  
Establish care 
limit alcohol consumption, follow low fat diet, follow low salt diet, call if any problems ICD-10-CM ICD-9-CM 1. Well woman exam (no gynecological exam) Z00.00 V70.0 [V70.0] 2. Sleep disorder G47.9 780.50 eszopiclone (LUNESTA) 1 mg tablet 3. Encounter to establish care Z76.89 V65.8   
will continue to prescribe lunesta for now, gave her one mg tablets and asked her to try lower doses for maximal effect. Will taper off lunesta and most likely change to trazadone, however, do not want to stop lunesta abruptly due to possibility of withdrawal 
 
An After Visit Summary was printed and given to the patient. All diagnosis have been discussed with the patient and all of the patient's questions have been answered. Follow-up Disposition: 
Return in about 4 weeks (around 3/6/2019) for insomnia meds.  
 
Josias Santiago, LISETH-BC 
 [facility] [address] [facility] Ehsan Valerio

## 2019-02-19 ENCOUNTER — TELEPHONE (OUTPATIENT)
Dept: FAMILY MEDICINE CLINIC | Age: 79
End: 2019-02-19

## 2019-02-19 NOTE — TELEPHONE ENCOUNTER
Spoke to pt and made aware of the message, verbalized understanding.  Thank you  Rahel Rosenbaum LPN

## 2019-02-19 NOTE — TELEPHONE ENCOUNTER
----- Message from Tino Mccall MD sent at 1/15/2019 10:07 AM EST -----  Overall blood work is unremarkable, continue Lipitor, fasting blood sugar slightly elevated decrease sugar intake.

## 2019-09-19 ENCOUNTER — TELEPHONE (OUTPATIENT)
Dept: FAMILY MEDICINE CLINIC | Age: 79
End: 2019-09-19
